# Patient Record
Sex: MALE | Race: OTHER | Employment: UNEMPLOYED | URBAN - METROPOLITAN AREA
[De-identification: names, ages, dates, MRNs, and addresses within clinical notes are randomized per-mention and may not be internally consistent; named-entity substitution may affect disease eponyms.]

---

## 2023-02-19 ENCOUNTER — APPOINTMENT (OUTPATIENT)
Dept: CT IMAGING | Age: 43
End: 2023-02-19

## 2023-02-19 ENCOUNTER — HOSPITAL ENCOUNTER (EMERGENCY)
Age: 43
Discharge: HOME | End: 2023-02-19
Attending: EMERGENCY MEDICINE | Admitting: EMERGENCY MEDICINE

## 2023-02-19 VITALS
DIASTOLIC BLOOD PRESSURE: 74 MMHG | WEIGHT: 154 LBS | HEART RATE: 74 BPM | BODY MASS INDEX: 25.66 KG/M2 | TEMPERATURE: 98.6 F | OXYGEN SATURATION: 100 % | RESPIRATION RATE: 16 BRPM | SYSTOLIC BLOOD PRESSURE: 147 MMHG | HEIGHT: 65 IN

## 2023-02-19 DIAGNOSIS — G44.201 ACUTE INTRACTABLE TENSION-TYPE HEADACHE: Primary | ICD-10-CM

## 2023-02-19 PROCEDURE — 99284 EMERGENCY DEPT VISIT MOD MDM: CPT

## 2023-02-19 PROCEDURE — 70450 CT HEAD/BRAIN W/O DYE: CPT

## 2023-02-19 PROCEDURE — 6370000000 HC RX 637 (ALT 250 FOR IP): Performed by: NURSE PRACTITIONER

## 2023-02-19 RX ORDER — BUTALBITAL, ACETAMINOPHEN AND CAFFEINE 50; 325; 40 MG/1; MG/1; MG/1
1 TABLET ORAL EVERY 4 HOURS PRN
Qty: 15 TABLET | Refills: 3 | Status: SHIPPED | OUTPATIENT
Start: 2023-02-19 | End: 2023-02-24

## 2023-02-19 RX ORDER — BUTALBITAL, ACETAMINOPHEN AND CAFFEINE 50; 325; 40 MG/1; MG/1; MG/1
1 TABLET ORAL
Status: COMPLETED | OUTPATIENT
Start: 2023-02-19 | End: 2023-02-19

## 2023-02-19 RX ORDER — IBUPROFEN 800 MG/1
800 TABLET ORAL
Status: COMPLETED | OUTPATIENT
Start: 2023-02-19 | End: 2023-02-19

## 2023-02-19 RX ADMIN — BUTALBITAL, ACETAMINOPHEN, AND CAFFEINE 1 TABLET: 50; 325; 40 TABLET ORAL at 19:00

## 2023-02-19 RX ADMIN — IBUPROFEN 800 MG: 800 TABLET, FILM COATED ORAL at 19:00

## 2023-02-19 ASSESSMENT — PAIN - FUNCTIONAL ASSESSMENT: PAIN_FUNCTIONAL_ASSESSMENT: NONE - DENIES PAIN

## 2023-02-19 ASSESSMENT — ENCOUNTER SYMPTOMS
ABDOMINAL PAIN: 0
NAUSEA: 0
BACK PAIN: 0
SHORTNESS OF BREATH: 0
EYES NEGATIVE: 1
DIARRHEA: 0

## 2023-02-19 ASSESSMENT — PAIN SCALES - GENERAL
PAINLEVEL_OUTOF10: 6
PAINLEVEL_OUTOF10: 0

## 2023-02-19 ASSESSMENT — PAIN DESCRIPTION - LOCATION: LOCATION: HEAD

## 2023-02-19 NOTE — ED PROVIDER NOTES
Emergency Department Provider Note                   PCP:                None Provider               Age: 38 y.o. Sex: male       ICD-10-CM    1. Acute intractable tension-type headache  G44. [facility] To Discharge 02/19/2023 08:40:30 PM    Nimisha Patton is a 38 y.o. male who presents to the Emergency Department with chief complaint of headache. Patient is well-appearing with no evidence of meningismus. Is hypertensive at 147/74.  used at the bedside for entire interview and physical exam.  Differential considered but not limited to [facility],, migraine, tension headache, meningitis, brain mass. Given patient's history of poor sleep and diet habits, I think this is likely a contributing factor. Shared decision making used regarding course of treatment with medications versus neuroimaging. Patient prefers neuroimaging at this time. We will do a noncontrasted CT with treatment of headache symptoms with Fioricet and ibuprofen and will reassess. He has not been taking his blood pressure medicine for the last 5 days. I think he also could be taking this medication at night to help with any daytime side effects. CT scan shows no acute intracranial process. We will have him keep a headache diary to further quantify and classify the headache further. Instructed patient to take his blood pressure medicine regularly in the evening to help with side effects. He is hypertensive in the 140s today and I think would benefit from this lisinopril. Instructed him to improve his sleep, fluid and dietary habits. We will send him home with ibuprofen and Fioricet for improvement of his headaches. Patient is agreeable to this plan.  was used for the duration of my care of this patient. He has been well-appearing with normal vitals here in the ED. Strict return precautions given. Safe for discharge at this time.   Catherene Riedel, FNP-C, ENP-C  12:40 AM        Medical Decision Making  Amount and/or Complexity of Data Reviewed  Radiology: ordered. Risk  Prescription drug management. Complexity of Problem: 1 stable, acute illness. (3)    I have conducted an independent ordering and review of CT Scan. Considerations: Shared decision making was utilized in the care of this patient. Orders Placed This Encounter   Procedures    CT HEAD WO CONTRAST        Medications   butalbital-acetaminophen-caffeine (FIORICET, ESGIC) per tablet 1 tablet (1 tablet Oral Given 2/19/23 1900)   ibuprofen (ADVIL;MOTRIN) tablet 800 mg (800 mg Oral Given 2/19/23 1900)       Discharge Medication List as of 2/19/2023  8:42 PM        START taking these medications    Details   butalbital-acetaminophen-caffeine (FIORICET, ESGIC) -40 MG per tablet Take 1 tablet by mouth every 4 hours as needed for Headaches, Disp-15 tablet, R-3Print              Ramandeep Tamez is a 38 y.o. male who presents to the Emergency Department with chief complaint of headache. Chief Complaint   Patient presents with    Headache      HPI Cruzito rees is a 46-year-old male with history of hypertension, presenting to the ED for evaluation of headache. He reports a headache 1 week ago that was throughout his entire head but went away the next day. 3 days ago, his headache returned localized only to the posterior skull. No known injury or trauma. Denies any fever, vision changes, nausea, vomiting. He describes the headache as numbness, pressure, constant. He has 6month-old twins at home and has had a poor sleep and diet pattern since that time. He does get intermittent headaches and these feel similar. He denies any numbness or tingling to his upper or lower extremities, dizziness, severe headache. Again, no vision changes but does have a pupil change from a prior injury to his left eye. He has used 1 dose of Tylenol.   He is on daily 10 mg lisinopril but stopped taking 5 days ago because he felt weak, dizzy so he has been taking it every other day. Review of Systems   Constitutional:  Negative for fever. HENT: Negative. Eyes: Negative. Respiratory:  Negative for shortness of breath. Cardiovascular:  Negative for chest pain. Gastrointestinal:  Negative for abdominal pain, diarrhea and nausea. Musculoskeletal:  Negative for arthralgias and back pain. Neurological:  Positive for headaches. All other systems reviewed and are negative. Past Medical History:   Diagnosis Date    Hypertension         Past Surgical History:   Procedure Laterality Date    EYE SURGERY Left         History reviewed. No pertinent family history. Social History     Socioeconomic History    Marital status: Single     Spouse name: None    Number of children: None    Years of education: None    Highest education level: None   Tobacco Use    Smoking status: Never    Smokeless tobacco: Never   Vaping Use    Vaping Use: Never used   Substance and Sexual Activity    Alcohol use: Yes    Drug use: Not Currently         Patient has no known allergies. Discharge Medication List as of 2/19/2023  8:42 PM           Vitals signs and nursing note reviewed. No data found. Physical Exam  Vitals and nursing note reviewed. Constitutional:       Appearance: He is not ill-appearing. HENT:      Head:      Comments: No skull deformity, redness or warmth. No javier sign or raccoon eyes, no hemotympanums     Mouth/Throat:      Mouth: Mucous membranes are moist.      Pharynx: Oropharynx is clear. Eyes:      Comments: Left pupil is irregular from prior injury. Denies any vision changes. Right pupil is 3 brisk equal and reactive. All extraocular movements intact. Neck:      Comments: No signs of meningismus. Full range of motion of his neck without midline tenderness. Cardiovascular:      Rate and Rhythm: Normal rate.    Pulmonary:      Effort: Pulmonary effort is normal.      Breath sounds: Normal breath sounds. Comments: Respirations even and unlabored  Abdominal:      General: Abdomen is flat. Palpations: Abdomen is soft. Skin:     General: Skin is warm and dry. Neurological:      Comments: NIH score of 0. No focal neurodeficits. Stable ambulation, no numbness or tingling to his upper or lower extremities. All visual fields intact. Psychiatric:         Mood and Affect: Mood normal.        Procedures    Results for orders placed or performed during the hospital encounter of 02/19/23   CT HEAD WO CONTRAST    Narrative    EXAMINATION: CT HEAD WO CONTRAST    DATE: 2/19/2023 6:58 PM     INDICATION: Headache for a week. COMPARISON: None available. TECHNIQUE: Thin section noncontrast axial images were obtained through the head. Coronal reformats were obtained. CT dose lowering techniques were used, to   include: automated exposure control, adjustment for patient size, and or use of   iterative reconstruction. FINDINGS:     Intracranial contents: The density of the brain is appropriate for age. Gray-white differentiation is   preserved. The ventricles and sulci are normal in size and configuration. The   basilar cisterns are clear. There is no mass or mass effect. There is no   intracranial fluid collection or acute hemorrhage. Bones and extracranial soft tissues: There is no fracture identified. Leftward nasal septal deviation, spurring and   mass effect on the left inferior turbinate. Right maxillary sinus air-fluid   level. Left sphenoid sinus polypoid mucosal thickening. The mastoid air cells   and middle ears are clear. Scalp and imaged facial soft tissues are within   normal limits. Impression       1. No intracranial abnormalities. 2. Paranasal sinus opacification with a right maxillary air-fluid level. Correlate for acute sinusitis. Sundar Ferrari M.D.   Neuroradiologist  [facility] [place],   [place]    Thank you for this referral.  This exam was interpreted by a fellowship trained   neuroradiologist. If the patient's healthcare provider has any questions, a   Diversified neuroradiologist can be reached directly at [phone] at any   time. SLOT: Laine Berger M.D.   2/19/2023 7:32:00 PM        CT HEAD WO CONTRAST   Final Result       1. No intracranial abnormalities. 2. Paranasal sinus opacification with a right maxillary air-fluid level. Correlate for acute sinusitis. Leroy Cevalols M.D. Neuroradiologist   [facility] [place], [place]   [employer]      Thank you for this referral.  This exam was interpreted by a fellowship trained    neuroradiologist. If the patient's healthcare provider has any questions, a    Diversified neuroradiologist can be reached directly at [phone] at any    time. SLOT: Laine Tonie Berger M.D.    2/19/2023 7:32:00 PM                          Voice dictation software was used during the making of this note. This software is not perfect and grammatical and other typographical errors may be present. This note has not been completely proofread for errors.         JORGE Lim NP  02/20/23 [id number]

## 2023-02-19 NOTE — ED TRIAGE NOTES
Patient reports intermittent head ache x 7 days, today has feeling of pressure in back of head. Patient denies any numbness, vision changes or speech problems.

## 2023-02-20 NOTE — DISCHARGE INSTRUCTIONS
Take your blood pressure medicine nightly. Keep a headache diary and schedule an appointment tomorrow with your primary care for later this week.   Return to the ED for fever, severe neck pain, vision changes, severe headache, numbness or tingling to upper or lower extremities or difficulty with walking

## 2023-02-20 NOTE — ED NOTES
I have reviewed discharge instructions with the patient. The patient verbalized understanding. Patient left ED via Discharge Method: ambulatory to Home with self. Opportunity for questions and clarification provided. Patient given 1 scripts. To continue your aftercare when you leave the hospital, you may receive an automated call from our care team to check in on how you are doing. This is a free service and part of our promise to provide the best care and service to meet your aftercare needs.  If you have questions, or wish to unsubscribe from this service please call [phone]. Thank you for Choosing our [facility] Emergency Department.         Sav Saba RN  02/19/23 [id number]

## 2024-02-14 ENCOUNTER — APPOINTMENT (OUTPATIENT)
Dept: CT IMAGING | Age: 44
End: 2024-02-14

## 2024-02-14 ENCOUNTER — HOSPITAL ENCOUNTER (EMERGENCY)
Age: 44
Discharge: HOME | End: 2024-02-15

## 2024-02-14 DIAGNOSIS — R31.29 OTHER MICROSCOPIC HEMATURIA: ICD-10-CM

## 2024-02-14 DIAGNOSIS — R35.0 URINARY FREQUENCY: Primary | ICD-10-CM

## 2024-02-14 LAB
ALBUMIN SERPL-MCNC: 4.5 G/DL (ref 3.5–5)
ALBUMIN/GLOB SERPL: 1.7 (ref 0.4–1.6)
ALP SERPL-CCNC: 80 U/L (ref 50–136)
ALT SERPL-CCNC: 38 U/L (ref 12–65)
ANION GAP SERPL CALC-SCNC: 4 MMOL/L (ref 2–11)
APPEARANCE UR: CLEAR
AST SERPL-CCNC: 18 U/L (ref 15–37)
BACTERIA URNS QL MICRO: NEGATIVE /HPF
BASOPHILS # BLD: 0 K/UL (ref 0–0.2)
BASOPHILS NFR BLD: 0 % (ref 0–2)
BILIRUB SERPL-MCNC: 1.7 MG/DL (ref 0.2–1.1)
BILIRUB UR QL: NEGATIVE
BUN SERPL-MCNC: 13 MG/DL (ref 6–23)
CALCIUM SERPL-MCNC: 8.8 MG/DL (ref 8.3–10.4)
CASTS URNS QL MICRO: ABNORMAL /LPF
CHLORIDE SERPL-SCNC: 105 MMOL/L (ref 103–113)
CO2 SERPL-SCNC: 28 MMOL/L (ref 21–32)
COLOR UR: ABNORMAL
CREAT SERPL-MCNC: 0.97 MG/DL (ref 0.8–1.5)
DIFFERENTIAL METHOD BLD: ABNORMAL
EOSINOPHIL # BLD: 0 K/UL (ref 0–0.8)
EOSINOPHIL NFR BLD: 0 % (ref 0.5–7.8)
EPI CELLS #/AREA URNS HPF: ABNORMAL /HPF
ERYTHROCYTE [DISTWIDTH] IN BLOOD BY AUTOMATED COUNT: 12.6 % (ref 11.9–14.6)
GLOBULIN SER CALC-MCNC: 2.6 G/DL (ref 2.8–4.5)
GLUCOSE SERPL-MCNC: 134 MG/DL (ref 65–100)
GLUCOSE UR STRIP.AUTO-MCNC: NEGATIVE MG/DL
HCT VFR BLD AUTO: 41.7 % (ref 41.1–50.3)
HGB BLD-MCNC: 15.3 G/DL (ref 13.6–17.2)
HGB UR QL STRIP: ABNORMAL
IMM GRANULOCYTES # BLD AUTO: 0 K/UL (ref 0–0.5)
IMM GRANULOCYTES NFR BLD AUTO: 0 % (ref 0–5)
KETONES UR QL STRIP.AUTO: NEGATIVE MG/DL
LEUKOCYTE ESTERASE UR QL STRIP.AUTO: NEGATIVE
LYMPHOCYTES # BLD: 0.5 K/UL (ref 0.5–4.6)
LYMPHOCYTES NFR BLD: 8 % (ref 13–44)
MCH RBC QN AUTO: 34.9 PG (ref 26.1–32.9)
MCHC RBC AUTO-ENTMCNC: 36.7 G/DL (ref 31.4–35)
MCV RBC AUTO: 95 FL (ref 82–102)
MONOCYTES # BLD: 0.5 K/UL (ref 0.1–1.3)
MONOCYTES NFR BLD: 7 % (ref 4–12)
NEUTS SEG # BLD: 5.6 K/UL (ref 1.7–8.2)
NEUTS SEG NFR BLD: 85 % (ref 43–78)
NITRITE UR QL STRIP.AUTO: NEGATIVE
NRBC # BLD: 0 K/UL (ref 0–0.2)
PH UR STRIP: 5.5 (ref 5–9)
PLATELET # BLD AUTO: 114 K/UL (ref 150–450)
PMV BLD AUTO: 9.6 FL (ref 9.4–12.3)
POTASSIUM SERPL-SCNC: 3.6 MMOL/L (ref 3.5–5.1)
PROT SERPL-MCNC: 7.1 G/DL (ref 6.3–8.2)
PROT UR STRIP-MCNC: NEGATIVE MG/DL
RBC # BLD AUTO: 4.39 M/UL (ref 4.23–5.6)
RBC #/AREA URNS HPF: ABNORMAL /HPF
SODIUM SERPL-SCNC: 137 MMOL/L (ref 136–146)
SP GR UR REFRACTOMETRY: 1.02 (ref 1–1.02)
UROBILINOGEN UR QL STRIP.AUTO: 0.2 EU/DL (ref 0.2–1)
WBC # BLD AUTO: 6.6 K/UL (ref 4.3–11.1)
WBC URNS QL MICRO: ABNORMAL /HPF

## 2024-02-14 PROCEDURE — 99284 EMERGENCY DEPT VISIT MOD MDM: CPT

## 2024-02-14 PROCEDURE — 74176 CT ABD & PELVIS W/O CONTRAST: CPT

## 2024-02-14 PROCEDURE — 81001 URINALYSIS AUTO W/SCOPE: CPT

## 2024-02-14 PROCEDURE — 85025 COMPLETE CBC W/AUTO DIFF WBC: CPT

## 2024-02-14 PROCEDURE — 80053 COMPREHEN METABOLIC PANEL: CPT

## 2024-02-14 PROCEDURE — 87086 URINE CULTURE/COLONY COUNT: CPT

## 2024-02-15 VITALS
SYSTOLIC BLOOD PRESSURE: 130 MMHG | DIASTOLIC BLOOD PRESSURE: 89 MMHG | OXYGEN SATURATION: 99 % | HEART RATE: 78 BPM | TEMPERATURE: 98.1 F | RESPIRATION RATE: 18 BRPM

## 2024-02-15 NOTE — ED TRIAGE NOTES
Pt arrives to the ER with c/o frequent urinating with little output x 3 months. Today pain has increased and back pain has began.

## 2024-02-15 NOTE — ED PROVIDER NOTES
Emergency Department Provider Note       PCP: Unknown, Provider, DO   Age: 43 y.o.   Sex: male     DISPOSITION Decision To Discharge 02/14/2024 11:47:11 PM       ICD-10-CM    1. [facility]inary [facility]  [facility]      2. Other microscopic hematuria  R31.29           Medical Decision Making     Complexity of Problems Addressed:  Complexity of Problem: 1 acute, uncomplicated illness or injury.    Data Reviewed and Analyzed:  I independently ordered and reviewed each unique test.  I reviewed external records: provider visit note from outside specialist.   The patients assessment required an independent historian: family member, .  The reason they were needed is important historical information not provided by the patient.    I interpreted the CT Scan. I reviewed images and radiology report.    Discussion of management or test interpretation.  43-year-old male presenting to the emergency department today for evaluation of urinary frequency.  Patient's son vital signs are stable here today.  No abdominal pain.  No CVA tenderness on exam.  No gross hematuria but does have small blood on urine dipstick.  I will send urine for culture.  CT scan without acute findings, no kidney stone or other acute abnormality.  Differential diagnosis including UTI, renal stone, malignancy, interstitial cystitis, overactive bladder.  I will refer patient back to urology at as it has been several years since he was last evaluated.  Patient feels comfortable with this discharge plan.  Return precautions discussed       Risk of Complications and/or Morbidity of Patient Management:  Shared medical decision making was utilized in creating the patients health plan today.      History      43-year-old male presenting to the emergency department today for evaluation of urinary frequency.  This is a chronic issue for the patient.  He has been evaluated by urology years ago but was never given a

## 2024-02-15 NOTE — DISCHARGE INSTRUCTIONS
As discussed you did have some blood in your urine but there was no sign of infection.  I am going to send your urine for culture to confirm this.  Your blood work was reassuring and your CT scan was reassuring.     Follow-up with your PCP in 1 to 2 days if no improvement, I have also sent a referral for you to follow up with Urology. Return to the ER for any new or worsening symptoms.

## 2024-02-15 NOTE — ED NOTES
I have reviewed discharge instructions with the patient and family.  The patient and family verbalized understanding.    Patient left ED via Discharge Method: ambulatory to Home with family.    Opportunity for questions and clarification provided.       Patient given 0 scripts.         To continue your aftercare when you leave the hospital, you may receive an automated call from our care team to check in on how you are doing.  This is a free service and part of our promise to provide the best care and service to meet your aftercare needs.” If you have questions, or wish to unsubscribe from this service please call [phone].  Thank you for Choosing our [facility] Emergency Department.

## 2024-02-17 LAB
BACTERIA SPEC CULT: NORMAL
SERVICE CMNT-IMP: NORMAL

## 2024-03-12 ENCOUNTER — OFFICE VISIT (OUTPATIENT)
Dept: UROLOGY | Age: 44
End: 2024-03-12

## 2024-03-12 DIAGNOSIS — R35.0 URINARY FREQUENCY: Primary | ICD-10-CM

## 2024-03-12 DIAGNOSIS — R39.14 FEELING OF INCOMPLETE BLADDER EMPTYING: ICD-10-CM

## 2024-03-12 LAB
BILIRUBIN, URINE, POC: NEGATIVE
BLOOD URINE, POC: NORMAL
GLUCOSE URINE, POC: NEGATIVE
KETONES, URINE, POC: NEGATIVE
LEUKOCYTE ESTERASE, URINE, POC: NEGATIVE
NITRITE, URINE, POC: NEGATIVE
PH, URINE, POC: 6 (ref 4.6–8)
PROTEIN,URINE, POC: NEGATIVE
PVR, POC: 34 CC
SPECIFIC GRAVITY, URINE, POC: 1.02 (ref 1–1.03)
URINALYSIS CLARITY, POC: NORMAL
URINALYSIS COLOR, POC: NORMAL
UROBILINOGEN, POC: NORMAL

## 2024-03-12 PROCEDURE — 99204 OFFICE O/P NEW MOD 45 MIN: CPT | Performed by: NURSE PRACTITIONER

## 2024-03-12 PROCEDURE — 81003 URINALYSIS AUTO W/O SCOPE: CPT | Performed by: NURSE PRACTITIONER

## 2024-03-12 PROCEDURE — 51798 US URINE CAPACITY MEASURE: CPT | Performed by: NURSE PRACTITIONER

## 2024-03-12 RX ORDER — TAMSULOSIN HYDROCHLORIDE 0.4 MG/1
0.4 CAPSULE ORAL
Qty: 30 CAPSULE | Refills: 0 | Status: SHIPPED | OUTPATIENT
Start: 2024-03-12

## 2024-03-12 RX ORDER — DOXYCYCLINE 100 MG/1
100 CAPSULE ORAL 2 TIMES DAILY
Qty: 28 CAPSULE | Refills: 0 | Status: SHIPPED | OUTPATIENT
Start: 2024-03-12 | End: 2024-03-26

## 2024-03-12 ASSESSMENT — ENCOUNTER SYMPTOMS
COUGH: 0
SHORTNESS OF BREATH: 0
BLOOD IN STOOL: 0
NAUSEA: 0
HEARTBURN: 0
INDIGESTION: 0
EYE DISCHARGE: 0
CONSTIPATION: 0
ABDOMINAL PAIN: 0
BACK PAIN: 1
EYE PAIN: 0
SKIN LESIONS: 0
DIARRHEA: 0
WHEEZING: 0
VOMITING: 0

## 2024-03-12 NOTE — PROGRESS NOTES
Dipstick  Results for orders placed or performed in visit on 03/12/24   AMB POC URINALYSIS DIP STICK AUTO W/O MICRO   Result Value Ref Range    Color (UA POC)      Clarity (UA POC)      Glucose, Urine, POC Negative     Bilirubin, Urine, POC Negative     KETONES, Urine, POC Negative     Specific Gravity, Urine, POC 1.020 1.001 - 1.035    Blood (UA POC) Trace-intact     pH, Urine, POC 6.0 4.6 - 8.0    Protein, Urine, POC Negative     Urobilinogen, POC 0.2 mg/dL     Nitrite, Urine, POC Negative     Leukocyte Esterase, Urine, POC Negative        UA - Micro  WBC - 0  RBC - 0  Bacteria - 0  Epith - 0    PHYSICAL EXAM    General appearance - well appearing and in no distress  Mental status - alert, oriented to person, place, and time  Neck - supple, no significant adenopathy  Chest/Lung-  Quiet, even and easy respiratory effort without use of accessory muscles  Skin - normal coloration and turgor, no rashes      Assessment and Plan    ICD-10-CM    1. Urinary frequency  R35.0 AMB POC URINALYSIS DIP STICK AUTO W/O MICRO     AMB POC PVR, LONNIE,POST-VOID RES,US,NON-IMAGING     tamsulosin (FLOMAX) 0.4 MG capsule     doxycycline monohydrate (MONODOX) 100 MG capsule      2. Feeling of incomplete bladder emptying  R39.14 AMB POC PVR, LONNIE,POST-VOID RES,US,NON-IMAGING     tamsulosin (FLOMAX) 0.4 MG capsule     doxycycline monohydrate (MONODOX) 100 MG capsule          Urinary freuqency/incomplete emptying of bladder- urine normal. PVR normal. CT normal. HOLD on cystoscopy. ?urethritis vs ?prostatitis vs ?BPH. Recommend trial of Flomax 0.4 mg PO q HS and doxycycline 100 mg PO BID x 14 days. Risks, benefits, and alternatives reviewed. Sun protection recommended.    RTO in 2 weeks for follow up with me. Advised to call sooner if sx worsen.    Jagruti Gibbons, APRN - CNP  Dr. Lara is supervising physician today and he approves plan of care.